# Patient Record
Sex: FEMALE | Race: WHITE | URBAN - METROPOLITAN AREA
[De-identification: names, ages, dates, MRNs, and addresses within clinical notes are randomized per-mention and may not be internally consistent; named-entity substitution may affect disease eponyms.]

---

## 2019-10-03 ENCOUNTER — OFFICE VISIT (OUTPATIENT)
Dept: SURGERY | Facility: CLINIC | Age: 39
End: 2019-10-03
Payer: COMMERCIAL

## 2019-10-03 VITALS — WEIGHT: 112 LBS | HEIGHT: 66 IN | BODY MASS INDEX: 18 KG/M2 | TEMPERATURE: 98.5 F

## 2019-10-03 DIAGNOSIS — L30.9 DERMATITIS OF PERIANAL REGION: Primary | ICD-10-CM

## 2019-10-03 PROCEDURE — 99244 OFF/OP CNSLTJ NEW/EST MOD 40: CPT | Performed by: SURGERY

## 2019-10-03 RX ORDER — HYDROCORTISONE 25 MG/G
OINTMENT TOPICAL
Refills: 0 | COMMUNITY
Start: 2019-09-23

## 2019-10-03 NOTE — LETTER
October 3, 2019     Marika Liao MD  [address] [facility], [address]  [facility] [address]    Patient: Johanny Ibanez  YOB: 1980  Date of Visit: 10/3/2019      Dear Dr. Liao:    Thank you for referring Johanny Ibanez to me for evaluation. Below are my notes for this consultation.    If you have questions, please do not hesitate to call me. I look forward to following your patient along with you.         Sincerely,        David Teague MD        CC: Que Rao, David Mercer MD  10/3/2019  7:02 PM  Sign at close encounter      Chief Complaint:   Chief Complaint   Patient presents with   • Rectal Pain     Itching   .    HPI     Patient is a 39 y.o. female who presents with the following:  Pt assumes her prob is hems    Saw GYN and gave prep H    She had itching - all day long - really bad - would keep her up at night - would wake up scratching - saw dermatologist one week ago - gave her HC cream - helped a lot -     Still has itchign - not as bad - now    Before derm - would be bad during day - has to scratch - PT WOULD USE WIPES TO CLEAN UP - [employer] WIPES - USES THEM FOR KIDS -     Shower water used to hurt / sting - urine would sting a lot to hit this area -     Used to use prep H - wipes - product from [employer] - did sitz baths and not helped - coconut oil -     No pain with BM - spots on paper after scratch - pt does not notice exta tissue - .     Medical History:   Past Medical History:   Diagnosis Date   • IBS (irritable bowel syndrome)        Surgical History:   Past Surgical History:   Procedure Laterality Date   •  SECTION     • COLONOSCOPY         Social History:   Social History     Social History   • Marital status:      Spouse name: N/A   • Number of children: N/A   • Years of education: N/A     Occupational History   • Not on file.     Social History Main Topics   • Smoking status: Former Smoker   • Smokeless tobacco: Never Used   • Alcohol use No   •  Drug use: Yes     Types: Marijuana   • Sexual activity: Not on file     Other Topics Concern   • Not on file     Social History Narrative   • No narrative on file       Family History:   History reviewed. No pertinent family history.    Allergies:   No known allergies    Current Medications:      Current Outpatient Prescriptions:   •  hydrocortisone 2.5 % ointment, APPLY ON THE SKIN DAILY, Disp: , Rfl: 0    Review of Systems  All 14 systems reviewed and the findings are not pertinent to the current problem.    Objective     Vital Signs  Vitals:    10/03/19 1628   Temp: 36.9 °C (98.5 °F)     Body mass index is 18.08 kg/m².      Physicial Exam  General Appearance:  Well developed.  Well nourished.  In no acute distress. Patient was not obese.  Head:  Posture of the head was normal.  Neck:  External features of the neck was normal.  Trachea:  Showed no abnormalities.  Trachea midline.  Extraocular Movements:  Normal.  Pupils:  Reactive to light.   Not deformed.  Oral Cavity:  Mixed dentition was not observed.  Tongue was midline.  Peripheral edema:  Not present.  Genitalia:  Normal.    Anorectal Examination:    No pilonidal sinus was observed.   No pilonidal cyst was observed.   Perianal skin was not erythematous.  No perianal wart was observed.  No anal fissure was observed.   Anus did not have a fistula.   Anal sphincter tone was normal.   No hemorrhoids were seen.   No external anal skin tags were observed.   Anus had no mass.  Rectum:  No rectal abscess was observed.   Rectal prolapse was not observed.   No rectal fistula was observed.   Rectal exam was not tender.   No rectal fluctuance was observed.  Rectal exam showed no mass.       Skin:  Dermatitis was seen on the aileen-anal skin.  Specifics:  White-margaret change in skin in irregular pattern around anus and extending towards vagina.    Other Exam:  Musculoskeletal System:  No gross defecits or defects of the upper or lower extremities.  No cyanosis of the  fingers.  Lumbar / Lumbosacral Spine:  Lumbar/lumbosacral spine exhibited no abnormalities.  Bilateral thighs:   Posterior aspect was not swollen.   Posterior aspect showed no induration.   Posterior aspect was not erythematous.   Posterior aspect was not warm.   No mass on the posterior aspect.  Functional Exam:   General/bilateral:  Mobility was not limited.  Neurological:   No confusion was observed.   No disorientation was observed.  Speech:  Normal.  Motor:  No tremor was seen.  Balance:  Normal.  Gait And Stance:  Normal.  Psychiatric:  Mood:  Euthymic.  Affect:  Normal.  Skin:  No clubbing of the fingernails.  Normal upper and lower extremity skin exam.      Media Information          Document Information     Wound Care      10/03/2019 16:59   Attached To:   Office Visit on 10/3/19 with David Teague MD   Source Information     David Teague MD  [facility]         Problem List Items Addressed This Visit     Dermatitis of perianal region - Primary     The patient has irritated skin around the anus and this will be treated with avoidance of hemorrhoid products and topical Calmoseptine to allow the skin to heal.   I will see them in one month.           Relevant Medications    hydrocortisone 2.5 % ointment              David Teague MD 10/3/2019 7:01 PM

## 2019-10-03 NOTE — PROGRESS NOTES
Chief Complaint:   Chief Complaint   Patient presents with   • Rectal Pain     Itching   .    HPI     Patient is a 39 y.o. female who presents with the following:  Pt assumes her prob is hems    Saw GYN and gave prep H    She had itching - all day long - really bad - would keep her up at night - would wake up scratching - saw dermatologist one week ago - gave her HC cream - helped a lot -     Still has itchign - not as bad - now    Before derm - would be bad during day - has to scratch - PT WOULD USE WIPES TO CLEAN UP - [employer] WIPES - USES THEM FOR KIDS -     Shower water used to hurt / sting - urine would sting a lot to hit this area -     Used to use prep H - wipes - product from [employer] - did sitz baths and not helped - coconut oil -     No pain with BM - spots on paper after scratch - pt does not notice exta tissue - .     Medical History:   Past Medical History:   Diagnosis Date   • IBS (irritable bowel syndrome)        Surgical History:   Past Surgical History:   Procedure Laterality Date   •  SECTION     • COLONOSCOPY         Social History:   Social History     Social History   • Marital status:      Spouse name: N/A   • Number of children: N/A   • Years of education: N/A     Occupational History   • Not on file.     Social History Main Topics   • Smoking status: Former Smoker   • Smokeless tobacco: Never Used   • Alcohol use No   • Drug use: Yes     Types: Marijuana   • Sexual activity: Not on file     Other Topics Concern   • Not on file     Social History Narrative   • No narrative on file       Family History:   History reviewed. No pertinent family history.    Allergies:   No known allergies    Current Medications:      Current Outpatient Prescriptions:   •  hydrocortisone 2.5 % ointment, APPLY ON THE SKIN DAILY, Disp: , Rfl: 0    Review of Systems  All 14 systems reviewed and the findings are not pertinent to the current problem.    Objective     Vital Signs  Vitals:    10/03/19 1628    Temp: 36.9 °C (98.5 °F)     Body mass index is 18.08 kg/m².      Physicial Exam  General Appearance:  Well developed.  Well nourished.  In no acute distress. Patient was not obese.  Head:  Posture of the head was normal.  Neck:  External features of the neck was normal.  Trachea:  Showed no abnormalities.  Trachea midline.  Extraocular Movements:  Normal.  Pupils:  Reactive to light.   Not deformed.  Oral Cavity:  Mixed dentition was not observed.  Tongue was midline.  Peripheral edema:  Not present.  Genitalia:  Normal.    Anorectal Examination:    No pilonidal sinus was observed.   No pilonidal cyst was observed.   Perianal skin was not erythematous.  No perianal wart was observed.  No anal fissure was observed.   Anus did not have a fistula.   Anal sphincter tone was normal.   No hemorrhoids were seen.   No external anal skin tags were observed.   Anus had no mass.  Rectum:  No rectal abscess was observed.   Rectal prolapse was not observed.   No rectal fistula was observed.   Rectal exam was not tender.   No rectal fluctuance was observed.  Rectal exam showed no mass.       Skin:  Dermatitis was seen on the aileen-anal skin.  Specifics:  White-margaret change in skin in irregular pattern around anus and extending towards vagina.    Other Exam:  Musculoskeletal System:  No gross defecits or defects of the upper or lower extremities.  No cyanosis of the fingers.  Lumbar / Lumbosacral Spine:  Lumbar/lumbosacral spine exhibited no abnormalities.  Bilateral thighs:   Posterior aspect was not swollen.   Posterior aspect showed no induration.   Posterior aspect was not erythematous.   Posterior aspect was not warm.   No mass on the posterior aspect.  Functional Exam:   General/bilateral:  Mobility was not limited.  Neurological:   No confusion was observed.   No disorientation was observed.  Speech:  Normal.  Motor:  No tremor was seen.  Balance:  Normal.  Gait And Stance:  Normal.  Psychiatric:  Mood:  Euthymic.  Affect:   Normal.  Skin:  No clubbing of the fingernails.  Normal upper and lower extremity skin exam.      Media Information          Document Information     Wound Care      10/03/2019 16:59   Attached To:   Office Visit on 10/3/19 with David Teague MD   Source Information     David Teague MD  [facility]         Problem List Items Addressed This Visit     Dermatitis of perianal region - Primary     The patient has irritated skin around the anus and this will be treated with avoidance of hemorrhoid products and topical Calmoseptine to allow the skin to heal.   I will see them in one month.           Relevant Medications    hydrocortisone 2.5 % ointment              David Teague MD 10/3/2019 7:01 PM

## 2019-10-03 NOTE — ASSESSMENT & PLAN NOTE
The patient has irritated skin around the anus and this will be treated with avoidance of hemorrhoid products and topical Calmoseptine to allow the skin to heal.   I will see them in one month.

## 2021-08-22 ENCOUNTER — IMMUNIZATIONS (OUTPATIENT)
Dept: FAMILY MEDICINE CLINIC | Facility: HOSPITAL | Age: 41
End: 2021-08-22

## 2021-08-22 DIAGNOSIS — Z23 ENCOUNTER FOR IMMUNIZATION: Primary | ICD-10-CM

## 2021-08-22 PROCEDURE — 0001A SARS-COV-2 / COVID-19 MRNA VACCINE (PFIZER-BIONTECH) 30 MCG: CPT

## 2021-08-22 PROCEDURE — 91300 SARS-COV-2 / COVID-19 MRNA VACCINE (PFIZER-BIONTECH) 30 MCG: CPT
